# Patient Record
Sex: FEMALE | Race: WHITE | NOT HISPANIC OR LATINO | URBAN - METROPOLITAN AREA
[De-identification: names, ages, dates, MRNs, and addresses within clinical notes are randomized per-mention and may not be internally consistent; named-entity substitution may affect disease eponyms.]

---

## 2020-10-19 PROBLEM — Z00.00 ENCOUNTER FOR PREVENTIVE HEALTH EXAMINATION: Status: ACTIVE | Noted: 2020-10-19

## 2023-07-05 ENCOUNTER — EMERGENCY (EMERGENCY)
Facility: HOSPITAL | Age: 53
LOS: 1 days | Discharge: HOME | End: 2023-07-05
Attending: STUDENT IN AN ORGANIZED HEALTH CARE EDUCATION/TRAINING PROGRAM | Admitting: STUDENT IN AN ORGANIZED HEALTH CARE EDUCATION/TRAINING PROGRAM
Payer: COMMERCIAL

## 2023-07-05 VITALS
SYSTOLIC BLOOD PRESSURE: 132 MMHG | TEMPERATURE: 98 F | OXYGEN SATURATION: 95 % | DIASTOLIC BLOOD PRESSURE: 88 MMHG | HEART RATE: 79 BPM | RESPIRATION RATE: 14 BRPM

## 2023-07-05 VITALS
WEIGHT: 147.93 LBS | DIASTOLIC BLOOD PRESSURE: 98 MMHG | TEMPERATURE: 98 F | SYSTOLIC BLOOD PRESSURE: 145 MMHG | OXYGEN SATURATION: 99 % | HEART RATE: 76 BPM | HEIGHT: 65 IN | RESPIRATION RATE: 16 BRPM

## 2023-07-05 PROCEDURE — 99283 EMERGENCY DEPT VISIT LOW MDM: CPT

## 2023-07-05 PROCEDURE — 99284 EMERGENCY DEPT VISIT MOD MDM: CPT

## 2023-07-05 RX ORDER — CEPHALEXIN 500 MG
1 CAPSULE ORAL
Qty: 28 | Refills: 0
Start: 2023-07-05 | End: 2023-07-11

## 2023-07-05 NOTE — ED ADULT TRIAGE NOTE - CHIEF COMPLAINT QUOTE
Pt. to ED for lower right leg injury.  Pt. states she banged her lower right leg into the corner of a table a week ago.  Since then wound has gotten red, hot and swollen.  Pt. states wound has been oozing.  Pt. denies any fever or chills.  Pt. states she had an old amoxicillin for a tooth infection and took one dose today.

## 2023-07-05 NOTE — ED ADULT NURSE NOTE - NSFALLUNIVINTERV_ED_ALL_ED
Bed/Stretcher in lowest position, wheels locked, appropriate side rails in place/Call bell, personal items and telephone in reach/Instruct patient to call for assistance before getting out of bed/chair/stretcher/Non-slip footwear applied when patient is off stretcher/[place] to call system/Physically safe environment - no spills, clutter or unnecessary equipment/Purposeful proactive rounding/Room/bathroom lighting operational, light cord in reach

## 2023-07-05 NOTE — ED ADULT NURSE NOTE - OBJECTIVE STATEMENT
53y/o female presents to the ED from home c/o right lower leg wound worsening over past week, pt reports cleaning with soap and water with bacitracin in place on dressing. pt also states taking Augmentin today due to redness on lower leg. no active bleeding or drainage noted, MD Jenkins at bedside to assess. pt to be discharged with PO Keflex . Pt is AOx4, patent airway, clear lung sounds, soft non tender abdomen, strong peripheral pulses, no changes in bowel/bladder patterns, ambulates independently. Patient denies fever, chills, n/v, weakness, abd pain, diarrhea/constipation, numbness/tingling, urinary s/s, in no respiratory distress, no chest pain, Patient safety provided with call bell within reach and bed in the lowest position.

## 2023-07-05 NOTE — ED PROVIDER NOTE - PHYSICAL EXAMINATION
VITAL SIGNS: I have reviewed nursing notes and confirm.  CONSTITUTIONAL: well-appearing, non-toxic, NAD  SKIN: Warm dry, normal skin turgor R anterior shin abrasion, clean base, surrounding erythema, no fluctuance   HEAD: NCAT  EXT: Full ROM, no bony tenderness, no pedal edema, no calf tenderness  PSYCH: Cooperative, appropriate.

## 2023-07-05 NOTE — ED PROVIDER NOTE - OBJECTIVE STATEMENT
52-year-old female with no reported significant past medical history responded right anterior shin with noted redness without purulent drainage 1 week ago with no associated fever chills nausea vomiting.  Patient reports took 1 dose of Augmentin that she had leftover.  Came to ED for further evaluation.

## 2023-07-05 NOTE — ED PROVIDER NOTE - PATIENT PORTAL LINK FT
You can access the FollowMyHealth Patient Portal offered by [facility] by registering at the following website: [email or web address]. By joining [facility]’s FollowMyHealth portal, you will also be able to view your health information using other applications (apps) compatible with our system.

## 2025-07-03 ENCOUNTER — EMERGENCY (EMERGENCY)
Facility: HOSPITAL | Age: 55
LOS: 1 days | End: 2025-07-03
Attending: STUDENT IN AN ORGANIZED HEALTH CARE EDUCATION/TRAINING PROGRAM | Admitting: STUDENT IN AN ORGANIZED HEALTH CARE EDUCATION/TRAINING PROGRAM

## 2025-07-03 ENCOUNTER — EMERGENCY (EMERGENCY)
Facility: HOSPITAL | Age: 55
LOS: 1 days | End: 2025-07-03
Attending: EMERGENCY MEDICINE | Admitting: EMERGENCY MEDICINE
Payer: COMMERCIAL

## 2025-07-03 VITALS
WEIGHT: 145.06 LBS | HEART RATE: 79 BPM | OXYGEN SATURATION: 98 % | RESPIRATION RATE: 17 BRPM | DIASTOLIC BLOOD PRESSURE: 91 MMHG | HEIGHT: 65 IN | SYSTOLIC BLOOD PRESSURE: 157 MMHG | TEMPERATURE: 97 F

## 2025-07-03 VITALS
OXYGEN SATURATION: 98 % | RESPIRATION RATE: 18 BRPM | TEMPERATURE: 98 F | HEIGHT: 65 IN | DIASTOLIC BLOOD PRESSURE: 91 MMHG | HEART RATE: 98 BPM | SYSTOLIC BLOOD PRESSURE: 172 MMHG

## 2025-07-03 VITALS
HEART RATE: 80 BPM | TEMPERATURE: 98 F | OXYGEN SATURATION: 97 % | DIASTOLIC BLOOD PRESSURE: 107 MMHG | RESPIRATION RATE: 14 BRPM | SYSTOLIC BLOOD PRESSURE: 177 MMHG

## 2025-07-03 VITALS
SYSTOLIC BLOOD PRESSURE: 170 MMHG | RESPIRATION RATE: 20 BRPM | TEMPERATURE: 98 F | DIASTOLIC BLOOD PRESSURE: 88 MMHG | HEART RATE: 84 BPM | OXYGEN SATURATION: 98 %

## 2025-07-03 LAB
ALBUMIN SERPL ELPH-MCNC: 3.4 G/DL — SIGNIFICANT CHANGE UP (ref 3.3–5)
ALP SERPL-CCNC: 59 U/L — SIGNIFICANT CHANGE UP (ref 40–120)
ALT FLD-CCNC: 24 U/L — SIGNIFICANT CHANGE UP (ref 10–45)
ANION GAP SERPL CALC-SCNC: 5 MMOL/L — SIGNIFICANT CHANGE UP (ref 5–17)
AST SERPL-CCNC: 19 U/L — SIGNIFICANT CHANGE UP (ref 10–40)
BASOPHILS # BLD AUTO: 0.03 K/UL — SIGNIFICANT CHANGE UP (ref 0–0.2)
BASOPHILS NFR BLD AUTO: 0.4 % — SIGNIFICANT CHANGE UP (ref 0–2)
BILIRUB SERPL-MCNC: 0.6 MG/DL — SIGNIFICANT CHANGE UP (ref 0.2–1.2)
BUN SERPL-MCNC: 15 MG/DL — SIGNIFICANT CHANGE UP (ref 7–23)
CALCIUM SERPL-MCNC: 9.2 MG/DL — SIGNIFICANT CHANGE UP (ref 8.4–10.5)
CHLORIDE SERPL-SCNC: 104 MMOL/L — SIGNIFICANT CHANGE UP (ref 96–108)
CO2 SERPL-SCNC: 30 MMOL/L — SIGNIFICANT CHANGE UP (ref 22–31)
CREAT SERPL-MCNC: 0.8 MG/DL — SIGNIFICANT CHANGE UP (ref 0.5–1.3)
EGFR: 87 ML/MIN/1.73M2 — SIGNIFICANT CHANGE UP
EGFR: 87 ML/MIN/1.73M2 — SIGNIFICANT CHANGE UP
EOSINOPHIL # BLD AUTO: 0.02 K/UL — SIGNIFICANT CHANGE UP (ref 0–0.5)
EOSINOPHIL NFR BLD AUTO: 0.3 % — SIGNIFICANT CHANGE UP (ref 0–6)
GLUCOSE SERPL-MCNC: 151 MG/DL — HIGH (ref 70–99)
HCT VFR BLD CALC: 42.6 % — SIGNIFICANT CHANGE UP (ref 34.5–45)
HGB BLD-MCNC: 14.7 G/DL — SIGNIFICANT CHANGE UP (ref 11.5–15.5)
IMM GRANULOCYTES NFR BLD AUTO: 0.3 % — SIGNIFICANT CHANGE UP (ref 0–0.9)
LYMPHOCYTES # BLD AUTO: 1.6 K/UL — SIGNIFICANT CHANGE UP (ref 1–3.3)
LYMPHOCYTES # BLD AUTO: 20.9 % — SIGNIFICANT CHANGE UP (ref 13–44)
MCHC RBC-ENTMCNC: 30.6 PG — SIGNIFICANT CHANGE UP (ref 27–34)
MCHC RBC-ENTMCNC: 34.5 G/DL — SIGNIFICANT CHANGE UP (ref 32–36)
MCV RBC AUTO: 88.8 FL — SIGNIFICANT CHANGE UP (ref 80–100)
MONOCYTES # BLD AUTO: 0.73 K/UL — SIGNIFICANT CHANGE UP (ref 0–0.9)
MONOCYTES NFR BLD AUTO: 9.6 % — SIGNIFICANT CHANGE UP (ref 2–14)
NEUTROPHILS # BLD AUTO: 5.24 K/UL — SIGNIFICANT CHANGE UP (ref 1.8–7.4)
NEUTROPHILS NFR BLD AUTO: 68.5 % — SIGNIFICANT CHANGE UP (ref 43–77)
NRBC BLD AUTO-RTO: 0 /100 WBCS — SIGNIFICANT CHANGE UP (ref 0–0)
PLATELET # BLD AUTO: 243 K/UL — SIGNIFICANT CHANGE UP (ref 150–400)
POTASSIUM SERPL-MCNC: 4.4 MMOL/L — SIGNIFICANT CHANGE UP (ref 3.5–5.3)
POTASSIUM SERPL-SCNC: 4.4 MMOL/L — SIGNIFICANT CHANGE UP (ref 3.5–5.3)
PROT SERPL-MCNC: 6.7 G/DL — SIGNIFICANT CHANGE UP (ref 6–8.3)
RBC # BLD: 4.8 M/UL — SIGNIFICANT CHANGE UP (ref 3.8–5.2)
RBC # FLD: 12.4 % — SIGNIFICANT CHANGE UP (ref 10.3–14.5)
SODIUM SERPL-SCNC: 139 MMOL/L — SIGNIFICANT CHANGE UP (ref 135–145)
WBC # BLD: 7.64 K/UL — SIGNIFICANT CHANGE UP (ref 3.8–10.5)
WBC # FLD AUTO: 7.64 K/UL — SIGNIFICANT CHANGE UP (ref 3.8–10.5)

## 2025-07-03 PROCEDURE — 80053 COMPREHEN METABOLIC PANEL: CPT

## 2025-07-03 PROCEDURE — 70487 CT MAXILLOFACIAL W/DYE: CPT | Mod: 26

## 2025-07-03 PROCEDURE — 96375 TX/PRO/DX INJ NEW DRUG ADDON: CPT | Mod: XU

## 2025-07-03 PROCEDURE — 85025 COMPLETE CBC W/AUTO DIFF WBC: CPT

## 2025-07-03 PROCEDURE — 99285 EMERGENCY DEPT VISIT HI MDM: CPT

## 2025-07-03 PROCEDURE — 36415 COLL VENOUS BLD VENIPUNCTURE: CPT

## 2025-07-03 PROCEDURE — L9997: CPT

## 2025-07-03 PROCEDURE — 96374 THER/PROPH/DIAG INJ IV PUSH: CPT | Mod: XU

## 2025-07-03 PROCEDURE — 70487 CT MAXILLOFACIAL W/DYE: CPT

## 2025-07-03 PROCEDURE — 99285 EMERGENCY DEPT VISIT HI MDM: CPT | Mod: 25

## 2025-07-03 RX ORDER — KETOROLAC TROMETHAMINE 30 MG/ML
15 INJECTION, SOLUTION INTRAMUSCULAR; INTRAVENOUS ONCE
Refills: 0 | Status: DISCONTINUED | OUTPATIENT
Start: 2025-07-03 | End: 2025-07-03

## 2025-07-03 RX ORDER — AMOXICILLIN AND CLAVULANATE POTASSIUM 500; 125 MG/1; MG/1
1 TABLET, FILM COATED ORAL
Qty: 20 | Refills: 0
Start: 2025-07-03 | End: 2025-07-12

## 2025-07-03 RX ORDER — DOXYCYCLINE HYCLATE 100 MG
100 TABLET ORAL ONCE
Refills: 0 | Status: COMPLETED | OUTPATIENT
Start: 2025-07-03 | End: 2025-07-03

## 2025-07-03 RX ORDER — AMOXICILLIN AND CLAVULANATE POTASSIUM 500; 125 MG/1; MG/1
1 TABLET, FILM COATED ORAL ONCE
Refills: 0 | Status: COMPLETED | OUTPATIENT
Start: 2025-07-03 | End: 2025-07-03

## 2025-07-03 RX ADMIN — AMOXICILLIN AND CLAVULANATE POTASSIUM 1 TABLET(S): 500; 125 TABLET, FILM COATED ORAL at 20:59

## 2025-07-03 RX ADMIN — Medication 100 MILLIGRAM(S): at 10:09

## 2025-07-03 RX ADMIN — KETOROLAC TROMETHAMINE 15 MILLIGRAM(S): 30 INJECTION, SOLUTION INTRAMUSCULAR; INTRAVENOUS at 10:09

## 2025-07-03 RX ADMIN — KETOROLAC TROMETHAMINE 15 MILLIGRAM(S): 30 INJECTION, SOLUTION INTRAMUSCULAR; INTRAVENOUS at 17:03

## 2025-07-03 NOTE — ED ADULT NURSE NOTE - OBJECTIVE STATEMENT
pt received to  intake , a&ox4 , ambulatory , phx of HTN  , p/w R sided facial  swelling transferred for dental  . Pt breathing even and unlabored on room air. Denies fever, chills, cough, SOB, chest pain, palpitations, dizziness, nausea, vomiting, diarrhea, constipation, numbness, tingling. IV noted to R 20 g . Labs collected and sent.pt educated on fall precautions and confirms understanding via teach back method. Stretcher locked in lowest position with siderails up x2. Call bell and personal items within reach.

## 2025-07-03 NOTE — ED PROVIDER NOTE - NSFOLLOWUPCLINICS_GEN_ALL_ED_FT
Oral & Maxillofacial Surgery  Department of Dental Medicine  [phone] [place] [address]  Phone: [phone]  Fax: [phone]

## 2025-07-03 NOTE — ED PROVIDER NOTE - ATTENDING CONTRIBUTION TO CARE
GEN: no acute respiratory distress. nontoxic, speaking comfortably in full sentences, ambulating with steady gait.  HEENT: NCAT.+ Right facial swelling.  No pain to percussion of teeth.  No malocclusion, drooling, trismus.  No stridor. PERRL 4mm, EOMI.  oropharynx wnl.  Neck: no JVD, trachea midline, no lymphadenopathy, FROM  CV: RRR. +S1S2, no murmur. 2+ pulses in 4 extremities, cap refill <2 sec  Chest: CTA B/l. no wheezing, rales, rhonchi. no retractions. good air movement. ecchymosis  MSK: No clubbing, cyanosis, edema. FROM of all extremities. no tenderness to palpation. No midline or paraspinal tenderness. .  Neuro: AAOX3.  CN 2-12 intact; Sensation intact, motor 5/5 throughout.   SKIN: No rash  54-year-old female no significant past medical history transferred from [facility] for evaluation by dentist.  Patient has had right upper tooth pain for approximately 2 to 3 days and right facial swelling for the past 1 day.  Patient took 2 doses of amoxicillin p.o. that she had leftover from several months ago.  CT at [facility] demonstrated paper lucency over the right maxillary canine.  Also local subperiosteal abscess.,  Impression dental abscess.  Patient denies fevers, chills, difficulty eating or drinking or breathing.  Patient does report pain to region.  No nausea vomiting.  Patient received doxycycline this morning for lack of hospital prior to transfer.  Plan: Simply as needed, dental consulted to evaluate patient.

## 2025-07-03 NOTE — ED PROVIDER NOTE - NSFOLLOWUPINSTRUCTIONS_ED_ALL_ED_FT
You were seen in the emergency department for a dental abscess. You were evaluated by the dental team and a drain was placed.     Please follow up in the dental clinic on Monday 7/7.    Please take Augmentin twice a day for the next 10 days.    - You may take 500-1000 mg acetaminophen (Tylenol) every 6 hours, as needed for pain. Do not take more than 4000 mg in a 24 hour period. Be aware many over the counter and prescription medications also contain acetaminophen (Tylenol).  - You may take 600 mg ibuprofen every 8 hours, with food, as needed for pain.    PLEASE RETURN TO THE EMERGENCY DEPARTMENT if you experience worsening of your symptoms or any of the following: fever, uncontrollable headache, trouble breathing, inability to eat or drink, bleeding that doesn't stop, loss of consciousness, chest pain, uncontrollable nausea/vomiting, weakness/numbness/tingling.    We hope you feel better! Thank you for trusting us with your care!

## 2025-07-03 NOTE — ED PROVIDER NOTE - PROGRESS NOTE DETAILS
Saint Pravin, DO (PGY3): seen and evaluated by dental. Drain placed. Recommending Augmentin and follow up in dental clinic for drain removal. Patient's pain is controlled. She feels comfortable going home. Will dc. Return precautions communicated. All questions answered.

## 2025-07-03 NOTE — ED ADULT NURSE REASSESSMENT NOTE - NS ED NURSE REASSESS COMMENT FT1
Pt is A&Ox4, appears comfortable resting in stretcher. airway patent, speaking clearly in full sentences. breathing is even and unlabored. Pt upset about wait time to be seen by dental and requesting to remove IV. MD [place] made aware, MD to page dental. Pt educated that IV may be needed for medication administration and may need to be reinserted. Patient verbalized understanding, IV removed cath intact. comfort measures provided. Stretcher set in lowest position, call bell within reach, safety maintained. Pt is A&Ox4, appears comfortable resting in stretcher. +right facial swelling noted. airway patent, speaking clearly in full sentences. breathing is even and unlabored. Pt upset about wait time to be seen by dental and requesting to remove IV. MD [place] made aware, MD to page dental. Pt educated that IV may be needed for medication administration and may need to be reinserted. Patient verbalized understanding, IV removed cath intact. comfort measures provided. Stretcher set in lowest position, call bell within reach, safety maintained.

## 2025-07-03 NOTE — ED PROVIDER NOTE - PATIENT PORTAL LINK FT
You can access the FollowMyHealth Patient Portal offered by [facility] by registering at the following website: [email or web address]. By joining [facility]’s FollowMyHealth portal, you will also be able to view your health information using other applications (apps) compatible with our system.

## 2025-07-03 NOTE — ED PROVIDER NOTE - CLINICAL SUMMARY MEDICAL DECISION MAKING FREE TEXT BOX
Saint Pravin, DO (PGY3): well-appearing 55 y/o female, with no medical history, presenting to the ED today as a transfer from [place] for evaluation by dental for dental abscess. Vitals stable. Patient afebrile. Dental consulted, will come evaluate patient. Patient reporting pain. Will give a second dose of Toradol and reassess. No indications for labs at this time. Dispo and further management pending results and reassessment.

## 2025-07-03 NOTE — ED ADULT NURSE REASSESSMENT NOTE - GENERAL PATIENT STATE
comfortable appearance/cooperative/improvement verbalized/family/SO at bedside/no change observed/smiling/interactive

## 2025-07-03 NOTE — ED PROVIDER NOTE - PHYSICAL EXAMINATION
General: alert, oriented to person, time, place  Psych: mood appropriate  Head: normocephalic; atraumatic  Eyes: conjunctivae clear bilaterally, sclerae anicteric  ENT: no nasal flaring, patent nares. Airway patent, mouth with normal mucosa. Clear oropharynx, uvula is midline. No tenderness to teeth  Cardio: non-tachycardic; skin warm and well perfused  Resp: normal respiratory effort; no accessory muscle use  GI: no active vomiting  Neuro: normal sensation, moving all four extremities equally  Skin: +R facial swelling, +warmth, +erythema  MSK: normal movement of all extremities  Lymph/Vasc: no LE edema

## 2025-07-03 NOTE — ED ADULT TRIAGE NOTE - CHIEF COMPLAINT QUOTE
transfer from [place] for dental, pt c/o right facial swelling last night, no fever and chill. Hx of HTN transfer from [place] for dental, pt c/o right facial swelling since last night, no fever and chill. Hx of HTN

## 2025-07-03 NOTE — ED ADULT NURSE NOTE - CHIEF COMPLAINT QUOTE
transfer from [place] for dental, pt c/o right facial swelling since last night, no fever and chill. Hx of HTN

## 2025-07-03 NOTE — ED PROVIDER NOTE - OBJECTIVE STATEMENT
[place] Louis, DO (PGY3): 53 y/o female, with no medical history, presenting to the ED today as a transfer from [place] for evaluation by dental. Patient had originally gone there for right facial swelling for two days. Found to have small subperiosteal abscess. Outside labs with no significant leukocytosis. Patient s/p doxycyline and Toradol. States pain is recurring. No fever or chills. No other symptoms.

## 2025-07-04 NOTE — PROGRESS NOTE ADULT - SUBJECTIVE AND OBJECTIVE BOX
Name: Elisa Mike  MRN: [id number]  : 1970  ·Chief Complaint: The patient is a 54y Female complaining of  · HPI Objective Statement: Saint Pravin, DO (PGY3): 53 y/o female, with no medical history, presenting to the ED today as a transfer from [place] for evaluation by dental. Patient had originally gone there for right facial swelling for two days. Found to have small subperiosteal abscess. Outside labs with no significant leukocytosis. Patient s/p doxycyline and Toradol. States pain is recurring. No fever or chills. No other symptoms.    Dental consult: Dental was consulted for transfer from [facility] for facial swelling, with suspected tooth #6.  Pt was administered IV doxycycline and a CT max face was taken prior to arrival.      PAST MEDICAL & SURGICAL HISTORY: no contributory to Med Hx    (-) heart valve replacement  (-) joint replacement  (-) pregnancy    MEDICATIONS  (STANDING): no medications    Allergies: NKDA    *Last Dental Visit:  Seen regularly by private dentist for comprehensive care, last visit was one month ago for deep cleaning.  Pt complains of past swellings occurring after deep cleanings.    LABS:  WBC Count: 7.64 K/uL [3.80 - 10.50] ( @ 10:05)  Platelet Count - Automated: 243 K/uL [150 - 400] ( @ 10:05)    EOE:  TMJ ( - ) clicks                     ( - ) pops                     ( - ) crepitus             Mandible FROM             Facial bones and MOM grossly intact             ( - ) trismus             ( - ) lymphadenopathy             ( + ) swelling, cellulitic upper right periorbital edema, obliteration of the nasal labial fold, upper right lip swelling             ( + ) asymmetry, right sided             ( + ) palpation, tenderness to palpation on upper right             ( - ) dyspnea             (- ) dysphagia             ( - ) loss of consciousness    IOE:  permanent dentition: multiple restored teeth, grossly intact           hard/soft palate:  ( - ) palatal torus, No pathology noted           tongue/FOM - No pathology noted           labial/buccal mucosa: Buccal vestibular swelling and fluctuance adjacent to teeth #4-6           ( + ) percussion: #5 severe, #6 slight pain           ( + ) palpation: #5 severe, #6 slight           ( + ) swelling: Upper right vestibular swelling           ( + ) abscess: Upper right, no visible parulis           ( - ) sinus tract    Dentition present: Grossly intact  Mobility: #5 Grade 2 mobility, no other mobility noted  Caries: no gross caries     *DENTAL RADIOGRAPHS:  PAs taken,   #5 Intact crown with PARL, radiographic bone loss; #6 and #4 appear intact radiographically.  No gross caries noted radiographically.    RADIOLOGY & ADDITIONAL STUDIES:  CT Max face;   IMPRESSION:  Dental abscess as described.  "There is periapical lucency about the right maxillary canine which has an associated dental crown. Associated thinning of the buccal cortex. Small adjacent subperiosteal abscess measuring approximately 6 mm anterior to posterior by 7 mm craniocaudad by 2 mm in thickness. Adjacent/overlying inflammatory changes."    *ASSESSMENT:  Canine Space Abscess most likely associated with tooth #5; less likely off tooth #6 based off radiographic and clinical exam.  History of deep cleanings, current mobility, and symptoms suggest possible endo-perio etiology.     *PLAN:  I&D upper right    Verbal consent given.  Anesthesia: 3.4 cc 4% Septocaine [1:609271 epi] administered locally via buccal infiltration.   Treatment: #15 blade used to make 1.5 cm incision from #4-6. Blunt dissection with hemostat. Hemopurulence appreciated. Area irrigated with sterile saline. Hemostasis achieved. Penrose drain placed and secured with 3-0 silk suture. Post-op instructions given verbally. Pt given an appt at least 2-4 days from today's apt at [facility] to remove drain. All questions answered.     RECOMMENDATIONS:  1. Discharge on PO augmentin and OTC pain meds  2. Pain control as recommended by med team  3. F/U with [facility] for drain removal and return to pt's outside dentist for comprehensive care.   Department of Dental Medicine  Mon-Thurs 8:30am-4:30pm  Fri 8:00am-4:00pm  [facility]  [phone] [place] [address]  Tel [phone]  4. Return to the ED if swelling increases or if any difficulty swallowing/ breathing, or fever occur.    Preeti Richardson DDS #[id number]  Mckenna Vora DDS #[id number]